# Patient Record
Sex: FEMALE | Race: BLACK OR AFRICAN AMERICAN | NOT HISPANIC OR LATINO | URBAN - NONMETROPOLITAN AREA
[De-identification: names, ages, dates, MRNs, and addresses within clinical notes are randomized per-mention and may not be internally consistent; named-entity substitution may affect disease eponyms.]

---

## 2021-04-29 ENCOUNTER — IMPORTED ENCOUNTER (OUTPATIENT)
Dept: URBAN - NONMETROPOLITAN AREA CLINIC 1 | Facility: CLINIC | Age: 36
End: 2021-04-29

## 2021-04-29 NOTE — PATIENT DISCUSSION
Orbital contusion left side-No evidence of entrapment of orbital muscles. -Full range of motion OU with ability to converge.-No RAPD OS.-No evidence of retinal holes tears or breaks OS.-No evidence of optic nerve damage or papilledema OS. -Recommend follow up PRN

## 2021-04-30 PROBLEM — S05.12XA: Noted: 2021-04-30

## 2022-04-09 ASSESSMENT — VISUAL ACUITY
OD_CC: 20/50
OS_CC: 20/20